# Patient Record
Sex: FEMALE | Race: WHITE | NOT HISPANIC OR LATINO | Employment: OTHER | URBAN - METROPOLITAN AREA
[De-identification: names, ages, dates, MRNs, and addresses within clinical notes are randomized per-mention and may not be internally consistent; named-entity substitution may affect disease eponyms.]

---

## 2017-07-13 PROBLEM — M81.0 AGE-RELATED OSTEOPOROSIS WITHOUT CURRENT PATHOLOGICAL FRACTURE: Status: ACTIVE | Noted: 2017-07-13

## 2017-09-10 ENCOUNTER — OFFICE VISIT (OUTPATIENT)
Dept: URGENT CARE | Facility: CLINIC | Age: 77
End: 2017-09-10
Payer: MEDICARE

## 2017-09-10 VITALS
SYSTOLIC BLOOD PRESSURE: 118 MMHG | BODY MASS INDEX: 27.32 KG/M2 | HEIGHT: 65 IN | RESPIRATION RATE: 18 BRPM | WEIGHT: 164 LBS | TEMPERATURE: 97 F | OXYGEN SATURATION: 98 % | DIASTOLIC BLOOD PRESSURE: 44 MMHG | HEART RATE: 97 BPM

## 2017-09-10 DIAGNOSIS — L03.012 CELLULITIS OF FINGER OF LEFT HAND: ICD-10-CM

## 2017-09-10 DIAGNOSIS — M79.645 PAIN OF FINGER OF LEFT HAND: Primary | ICD-10-CM

## 2017-09-10 PROCEDURE — 96372 THER/PROPH/DIAG INJ SC/IM: CPT | Mod: S$GLB,,, | Performed by: SURGERY

## 2017-09-10 PROCEDURE — 99203 OFFICE O/P NEW LOW 30 MIN: CPT | Mod: 25,S$GLB,, | Performed by: SURGERY

## 2017-09-10 PROCEDURE — 1159F MED LIST DOCD IN RCRD: CPT | Mod: S$GLB,,, | Performed by: SURGERY

## 2017-09-10 RX ORDER — BETAMETHASONE SODIUM PHOSPHATE AND BETAMETHASONE ACETATE 3; 3 MG/ML; MG/ML
9 INJECTION, SUSPENSION INTRA-ARTICULAR; INTRALESIONAL; INTRAMUSCULAR; SOFT TISSUE ONCE
Status: COMPLETED | OUTPATIENT
Start: 2017-09-10 | End: 2017-09-10

## 2017-09-10 RX ORDER — MUPIROCIN 20 MG/G
OINTMENT TOPICAL 3 TIMES DAILY
Qty: 1 TUBE | Refills: 0 | Status: SHIPPED | OUTPATIENT
Start: 2017-09-10 | End: 2018-02-05

## 2017-09-10 RX ORDER — SULFAMETHOXAZOLE AND TRIMETHOPRIM 800; 160 MG/1; MG/1
1 TABLET ORAL 2 TIMES DAILY
COMMUNITY
End: 2018-02-05

## 2017-09-10 RX ADMIN — BETAMETHASONE SODIUM PHOSPHATE AND BETAMETHASONE ACETATE 9 MG: 3; 3 INJECTION, SUSPENSION INTRA-ARTICULAR; INTRALESIONAL; INTRAMUSCULAR; SOFT TISSUE at 10:09

## 2017-09-10 NOTE — PROGRESS NOTES
"Subjective:       Patient ID: Coby Self is a 77 y.o. female.    Vitals:  height is 5' 5" (1.651 m) and weight is 74.4 kg (164 lb). Her temperature is 97.3 °F (36.3 °C). Her blood pressure is 118/44 (abnormal) and her pulse is 97. Her respiration is 18 and oxygen saturation is 98%.     Chief Complaint: Cellulitis    Edema   This is a new problem. The current episode started in the past 7 days. The problem occurs constantly. The problem has been gradually worsening. Associated symptoms include joint swelling. Pertinent negatives include no abdominal pain, chest pain, chills, fever, headaches, nausea, rash, sore throat or vomiting. Nothing aggravates the symptoms. She has tried ice and NSAIDs (Bactrim) for the symptoms. The treatment provided no relief.     Review of Systems   Constitution: Negative for chills and fever.   HENT: Negative for sore throat.    Eyes: Negative for blurred vision.   Cardiovascular: Negative for chest pain.   Respiratory: Negative for shortness of breath.    Skin: Negative for rash.   Musculoskeletal: Positive for joint pain (RT Index Finger) and joint swelling. Negative for back pain.   Gastrointestinal: Negative for abdominal pain, diarrhea, nausea and vomiting.   Neurological: Negative for headaches.   Psychiatric/Behavioral: The patient is not nervous/anxious.        Objective:      Physical Exam   Musculoskeletal:        Hands:      Assessment:       1. Pain of finger of left hand    2. Cellulitis of finger of left hand        Plan:         Pain of finger of left hand  -     betamethasone acetate-betamethasone sodium phosphate injection 9 mg; Inject 1.5 mLs (9 mg total) into the muscle once.    Cellulitis of finger of left hand  -     mupirocin (BACTROBAN) 2 % ointment; Apply topically 3 (three) times daily.  Dispense: 1 Tube; Refill: 0    Pt with multiple allergies, including clindamycin. Her cellulitis is contained to original site of infection, not much improved or " worse.Primarily causing pain and unable to take pain medication due to nausea. D/w pt use of tramadol and tylenol. Steroid injection given as adjunct for pain management. Mupirocin added topical for cellulitis. Will f/u tomorrow with surgeon.

## 2017-09-10 NOTE — PATIENT INSTRUCTIONS
Discharge Instructions for Cellulitis  You have been diagnosed with cellulitis. This is an infection in the deepest layer of the skin. In some cases, the infection also affects the muscle. Cellulitis is caused by bacteria. The bacteria can enter the body through broken skin. This can happen with a cut, scratch, animal bite, or an insect bite that has been scratched. You may have been treated in the hospital with antibiotics and fluids. You will likely be given a prescription for antibiotics to take at home. This sheet will help you take care of yourself at home.  Home care  When you are home:  · Take the prescribed antibiotic medicine you are given as directed until it is gone. Take it even if you feel better. It treats the infection and stops it from returning. Not taking all the medicine can make future infections hard to treat.  · Keep the infected area clean.  · When possible, raise the infected area above the level of your heart. This helps keep swelling down.  · Talk with your healthcare provider if you are in pain. Ask what kind of over-the-counter medicine you can take for pain.  · Apply clean bandages as advised.  · Take your temperature once a day for a week.  · Wash your hands often to prevent spreading the infection.  In the future, wash your hands before and after you touch cuts, scratches, or bandages. This will help prevent infection.   When to call your healthcare provider  Call your healthcare provider immediately if you have any of the following:  · Difficulty or pain when moving the joints above or below the infected area  · Discharge or pus draining from the area  · Fever of 100.4°F (38°C) or higher, or as directed by your healthcare provider  · Pain that gets worse in or around the infected   · Redness that gets worse in or around the infected area, particularly if the area of redness expands to a wider area  · Shaking chills  · Swelling of the infected area  · Vomiting   Date Last Reviewed:  8/1/2016  © 6233-5811 [employer]. [address] [place] [address], [place] [address]. All rights reserved. This information is not intended as a substitute for professional medical care. Always follow your healthcare professional's instructions.

## 2017-09-14 ENCOUNTER — TELEPHONE (OUTPATIENT)
Dept: URGENT CARE | Facility: CLINIC | Age: 77
End: 2017-09-14

## 2017-12-26 PROBLEM — R09.02 HYPOXIA: Status: ACTIVE | Noted: 2017-12-26

## 2017-12-26 PROBLEM — I10 ESSENTIAL HYPERTENSION: Chronic | Status: ACTIVE | Noted: 2017-12-26

## 2017-12-26 PROBLEM — J11.1 INFLUENZA: Status: ACTIVE | Noted: 2017-12-26

## 2017-12-26 PROBLEM — J18.9 RIGHT LOWER LOBE PNEUMONIA: Status: ACTIVE | Noted: 2017-12-26

## 2017-12-27 PROBLEM — G89.29 CHRONIC PAIN: Chronic | Status: ACTIVE | Noted: 2017-12-27

## 2017-12-28 PROBLEM — R09.02 HYPOXIA: Status: RESOLVED | Noted: 2017-12-26 | Resolved: 2017-12-28

## 2018-04-16 ENCOUNTER — OFFICE VISIT (OUTPATIENT)
Dept: WOUND CARE | Facility: HOSPITAL | Age: 78
End: 2018-04-16
Attending: SURGERY
Payer: MEDICARE

## 2018-04-16 VITALS
SYSTOLIC BLOOD PRESSURE: 134 MMHG | TEMPERATURE: 98 F | DIASTOLIC BLOOD PRESSURE: 57 MMHG | HEART RATE: 59 BPM | RESPIRATION RATE: 18 BRPM

## 2018-04-16 DIAGNOSIS — L89.153 SACRAL DECUBITUS ULCER, STAGE III: ICD-10-CM

## 2018-04-16 PROCEDURE — 11042 DBRDMT SUBQ TIS 1ST 20SQCM/<: CPT

## 2018-04-16 PROCEDURE — 27201912 HC WOUND CARE DEBRIDEMENT SUPPLIES

## 2018-04-16 PROCEDURE — 99212 OFFICE O/P EST SF 10 MIN: CPT | Mod: 25

## 2018-04-16 NOTE — PROGRESS NOTES
[facility]  Wound Care  History and Physical    Problem List Items Addressed This Visit     Sacral decubitus ulcer, stage III    Overview     77-year-old female who presents to the wound center with a sacral ulcer.  Patient states that she has had this ulcer on and off for the last 6 months.  Patient struggles with back pain and sleeps sitting up in a bed.  She says she has healed this ulcer before but it has returned.  She has been applying Silvadene and completed a course of oral antibiotics.  She denies significant drainage from the area.  She reports the wound has been painful.  She says she has tried several offloading cushions and has a new cushion that she has ordered which she will be receiving this week.         Current Assessment & Plan     Continue offloading.  Debridement wound as needed.  Santyl dressing.                   History:    Past Medical History:   Diagnosis Date    Arthritis     GERD (gastroesophageal reflux disease)     Hyperlipemia     Hypertension     Vertigo        Past Surgical History:   Procedure Laterality Date    CHOLECYSTECTOMY      EYE SURGERY      FISSURE SURGERY      HERNIA REPAIR      HYSTERECTOMY      NECK SURGERY         No family history on file.     reports that she has never smoked. She has never used smokeless tobacco. She reports that she drinks alcohol. She reports that she does not use drugs.    has a current medication list which includes the following prescription(s): aspirin, atorvastatin, bifidobacterium infantis, calcium carbonate/vitamin d3, clobetasol 0.05%, clonidine, denosumab, dexlansoprazole, hydrochlorothiazide, meclizine, multivitamin/iron/folic acid, sotalol, tramadol, and valsartan.    Allergies:  Adhesive; Azithromycin; Ciprofloxacin; Codeine; Demerol [meperidine]; Doxycycline; Fosamax [alendronate]; Gabapentin; Lincomycin; Penicillins; and Vicodin [hydrocodone-acetaminophen]    Review of Systems:  ROS  Denies fever or  chills.    There were no vitals filed for this visit.      BMI:  There is no height or weight on file to calculate BMI.    Physical Exam:  Physical Exam well-developed well-nourished sitting up in bed in no distress.  Lungs are clear.  Heart is regular.  Abdomen soft with no tenderness or masses.  No lower extremity edema.  No gross neurological deficits.  Small stage III sacral decubitus ulcer with no sign of infection.    A1C:  No results for input(s): HGBA1C in the last 2160 hours.  BMP:  No results for input(s): GLU, NA, K, CL, CO2, BUN, CREATININE, CALCIUM, MG in the last 2160 hours.   CBC:  No results for input(s): WBC, RBC, HGB, HCT, PLT, MCV, MCH, MCHC in the last 2160 hours.  CMP:  No results for input(s): GLU, CALCIUM, ALBUMIN, PROT, NA, K, CO2, CL, BUN, ALKPHOS, ALT, AST, BILITOT in the last 2160 hours.    Invalid input(s): CREATININ  PREALBUMIN:  No results for input(s): PREALBUMIN in the last 2160 hours.  WOUND CULTURES:  No results for input(s): LABAERO in the last 2160 hours.        Plan:  See Wound Docs note for plan and follow up.        Hussein DUNCAN Cordova  [facility]

## 2018-04-23 ENCOUNTER — OFFICE VISIT (OUTPATIENT)
Dept: WOUND CARE | Facility: HOSPITAL | Age: 78
End: 2018-04-23
Attending: SURGERY
Payer: MEDICARE

## 2018-04-23 VITALS
HEART RATE: 78 BPM | SYSTOLIC BLOOD PRESSURE: 132 MMHG | TEMPERATURE: 98 F | RESPIRATION RATE: 18 BRPM | DIASTOLIC BLOOD PRESSURE: 78 MMHG

## 2018-04-23 DIAGNOSIS — G89.29 CHRONIC MIDLINE LOW BACK PAIN WITHOUT SCIATICA: ICD-10-CM

## 2018-04-23 DIAGNOSIS — L89.153 SACRAL DECUBITUS ULCER, STAGE III: ICD-10-CM

## 2018-04-23 DIAGNOSIS — M54.50 CHRONIC MIDLINE LOW BACK PAIN WITHOUT SCIATICA: ICD-10-CM

## 2018-04-23 PROCEDURE — 11042 DBRDMT SUBQ TIS 1ST 20SQCM/<: CPT

## 2018-04-23 PROCEDURE — 27201912 HC WOUND CARE DEBRIDEMENT SUPPLIES

## 2018-04-23 NOTE — ASSESSMENT & PLAN NOTE
Had a long discussion with the patient regarding offloading.  The fact that she sits to sleep appears to be the cause of the wound.  She also sits at work.  She works about 2-1/2 days a week.  She would try to sleep laying down even if that means lying on her side.  I also discussed with her obtaining a foam topper for her mattress.

## 2018-04-23 NOTE — PROGRESS NOTES
[facility]  Wound Care  Progress Note    Problem List Items Addressed This Visit        Derm    Sacral decubitus ulcer, stage III    Overview     77-year-old female who presented to the wound center on 4/16/18 with a sacral ulcer.  Patient states that she has had this ulcer on and off for 6 months.  Patient struggles with back pain and sleeps sitting up in a bed.  She says she has healed this ulcer before but it has returned.  She had been applying Silvadene and completed a course of oral antibiotics.  She denied significant drainage from the area.  She reports the wound has been painful.  She says she has tried several offloading cushions including a donut and a waffle cushion.  She has been utilizing santyl since admission to wound clinic.         Current Assessment & Plan     Had a long discussion with the patient regarding offloading.  The fact that she sits to sleep appears to be the cause of the wound.  She also sits at work.  She works about 2-1/2 days a week.  She would try to sleep laying down even if that means lying on her side.  I also discussed with her obtaining a foam topper for her mattress.            Orthopedic    Chronic midline low back pain without sciatica          See wound doc progress notes. Documents will be scanned.        George Alarcon  [facility]

## 2018-04-30 ENCOUNTER — OFFICE VISIT (OUTPATIENT)
Dept: WOUND CARE | Facility: HOSPITAL | Age: 78
End: 2018-04-30
Attending: SURGERY
Payer: MEDICARE

## 2018-04-30 VITALS
HEART RATE: 53 BPM | TEMPERATURE: 98 F | DIASTOLIC BLOOD PRESSURE: 58 MMHG | SYSTOLIC BLOOD PRESSURE: 136 MMHG | RESPIRATION RATE: 18 BRPM

## 2018-04-30 DIAGNOSIS — L89.153 SACRAL DECUBITUS ULCER, STAGE III: Primary | ICD-10-CM

## 2018-04-30 PROCEDURE — 27201912 HC WOUND CARE DEBRIDEMENT SUPPLIES

## 2018-04-30 PROCEDURE — 11042 DBRDMT SUBQ TIS 1ST 20SQCM/<: CPT

## 2018-04-30 NOTE — PROGRESS NOTES
[facility]  Wound Care  Progress Note    Problem List Items Addressed This Visit     Sacral decubitus ulcer, stage III - Primary    Overview     77-year-old female who presented to the wound center on 4/16/18 with a sacral ulcer.  Patient states that she has had this ulcer on and off for 6 months.  Patient struggles with back pain and sleeps sitting up in a bed.  She says she has healed this ulcer before but it has returned.  She had been applying Silvadene and completed a course of oral antibiotics.  She denied significant drainage from the area.  She reports the wound has been painful.  She says she has tried several offloading cushions including a donut and a waffle cushion.  She has been utilizing santyl since admission to wound clinic.               See wound doc progress notes. Documents will be scanned.        Hussein Cordova  [facility]

## 2018-05-07 ENCOUNTER — OFFICE VISIT (OUTPATIENT)
Dept: WOUND CARE | Facility: HOSPITAL | Age: 78
End: 2018-05-07
Attending: SURGERY
Payer: MEDICARE

## 2018-05-07 VITALS
DIASTOLIC BLOOD PRESSURE: 40 MMHG | RESPIRATION RATE: 18 BRPM | SYSTOLIC BLOOD PRESSURE: 119 MMHG | HEART RATE: 55 BPM | TEMPERATURE: 98 F

## 2018-05-07 DIAGNOSIS — G89.29 CHRONIC MIDLINE LOW BACK PAIN WITHOUT SCIATICA: ICD-10-CM

## 2018-05-07 DIAGNOSIS — M54.50 CHRONIC MIDLINE LOW BACK PAIN WITHOUT SCIATICA: ICD-10-CM

## 2018-05-07 DIAGNOSIS — L89.153 SACRAL DECUBITUS ULCER, STAGE III: Primary | ICD-10-CM

## 2018-05-07 PROCEDURE — 11042 DBRDMT SUBQ TIS 1ST 20SQCM/<: CPT

## 2018-05-07 PROCEDURE — 27201912 HC WOUND CARE DEBRIDEMENT SUPPLIES

## 2018-05-07 NOTE — PROGRESS NOTES
[facility]  Wound Care  Progress Note    Problem List Items Addressed This Visit        Derm    Sacral decubitus ulcer, stage III - Primary    Overview     77-year-old female who presented to the wound center on 4/16/18 with a sacral ulcer.  Patient states that she has had this ulcer on and off for 6 months.  Patient struggles with back pain and sleeps sitting up in a bed.  She says she has healed this ulcer before but it has returned.  She had been applying Silvadene and completed a course of oral antibiotics.  She denied significant drainage from the area.  She reports the wound has been painful but improved.  She says she has tried several offloading cushions including a donut and a waffle cushion.  She has been utilizing santyl since admission to wound clinic.         Current Assessment & Plan     Continue to make efforts to sleep lying down on her sides for offloading.            Orthopedic    Chronic midline low back pain without sciatica          See wound doc progress notes. Documents will be scanned.        George Alarcon  [facility]

## 2018-05-14 ENCOUNTER — OFFICE VISIT (OUTPATIENT)
Dept: WOUND CARE | Facility: HOSPITAL | Age: 78
End: 2018-05-14
Attending: SURGERY
Payer: MEDICARE

## 2018-05-14 VITALS — HEART RATE: 57 BPM | RESPIRATION RATE: 18 BRPM | DIASTOLIC BLOOD PRESSURE: 52 MMHG | SYSTOLIC BLOOD PRESSURE: 110 MMHG

## 2018-05-14 DIAGNOSIS — L89.153 SACRAL DECUBITUS ULCER, STAGE III: ICD-10-CM

## 2018-05-14 PROCEDURE — 27201912 HC WOUND CARE DEBRIDEMENT SUPPLIES

## 2018-05-14 PROCEDURE — 11042 DBRDMT SUBQ TIS 1ST 20SQCM/<: CPT

## 2018-05-14 NOTE — PROGRESS NOTES
[facility]  Wound Care  Progress Note    Problem List Items Addressed This Visit     Sacral decubitus ulcer, stage III    Overview     77-year-old female who presented to the wound center on 4/16/18 with a sacral ulcer.  Patient states that she has had this ulcer on and off for 6 months.  Patient struggles with back pain and sleeps sitting up in a bed.  She says she has healed this ulcer before but it has returned.  She had been applying Silvadene and completed a course of oral antibiotics.  She denied significant drainage from the area.  She reports the wound has been painful but improved.  She says she has tried several offloading cushions including a donut and a waffle cushion.  She has been utilizing santyl since admission to wound clinic.  Wound is starting to granulate with minimal amount of slough               See wound doc progress notes. Documents will be scanned.        Hussein Cordova  [facility]

## 2018-05-21 ENCOUNTER — OFFICE VISIT (OUTPATIENT)
Dept: WOUND CARE | Facility: HOSPITAL | Age: 78
End: 2018-05-21
Attending: SURGERY
Payer: MEDICARE

## 2018-05-21 VITALS
HEART RATE: 60 BPM | DIASTOLIC BLOOD PRESSURE: 58 MMHG | RESPIRATION RATE: 18 BRPM | TEMPERATURE: 98 F | SYSTOLIC BLOOD PRESSURE: 114 MMHG

## 2018-05-21 DIAGNOSIS — G89.29 CHRONIC MIDLINE LOW BACK PAIN WITHOUT SCIATICA: ICD-10-CM

## 2018-05-21 DIAGNOSIS — L89.153 SACRAL DECUBITUS ULCER, STAGE III: Primary | ICD-10-CM

## 2018-05-21 DIAGNOSIS — M54.50 CHRONIC MIDLINE LOW BACK PAIN WITHOUT SCIATICA: ICD-10-CM

## 2018-05-21 PROCEDURE — 27201912 HC WOUND CARE DEBRIDEMENT SUPPLIES

## 2018-05-21 PROCEDURE — 11042 DBRDMT SUBQ TIS 1ST 20SQCM/<: CPT

## 2018-05-21 NOTE — PROGRESS NOTES
[facility]  Wound Care  Progress Note    Problem List Items Addressed This Visit        Derm    Sacral decubitus ulcer, stage III - Primary    Overview     77-year-old female who presented to the wound center on 4/16/18 with a sacral ulcer.  Patient states that she has had this ulcer on and off for 6 months.  Patient struggles with back pain and sleeps sitting up in a bed.  She says she has healed this ulcer before but it has returned.  She had been applying Silvadene and completed a course of oral antibiotics.  She denied significant drainage from the area.  She reports the wound as being painful on admission.  She says she has tried several offloading cushions including a donut and a waffle cushion.  She has obtained a foam topper for her mattress for pressure relief.  She is attempting to sleep in a supine position  She has been utilizing santyl since admission to wound clinic.  Wound is starting to granulate with minimal amount of slough         Current Assessment & Plan     Wound is improving  Continue offloading.            Orthopedic    Chronic midline low back pain without sciatica          See wound doc progress notes. Documents will be scanned.        George Alarcon  [facility]

## 2018-06-04 ENCOUNTER — OFFICE VISIT (OUTPATIENT)
Dept: WOUND CARE | Facility: HOSPITAL | Age: 78
End: 2018-06-04
Attending: SURGERY
Payer: MEDICARE

## 2018-06-04 VITALS
HEART RATE: 69 BPM | TEMPERATURE: 98 F | SYSTOLIC BLOOD PRESSURE: 116 MMHG | DIASTOLIC BLOOD PRESSURE: 75 MMHG | RESPIRATION RATE: 18 BRPM

## 2018-06-04 DIAGNOSIS — L89.153 SACRAL DECUBITUS ULCER, STAGE III: ICD-10-CM

## 2018-06-04 PROCEDURE — 11042 DBRDMT SUBQ TIS 1ST 20SQCM/<: CPT

## 2018-06-04 PROCEDURE — 27201912 HC WOUND CARE DEBRIDEMENT SUPPLIES

## 2018-06-04 NOTE — PROGRESS NOTES
[facility]  Wound Care  Progress Note    Problem List Items Addressed This Visit     Sacral decubitus ulcer, stage III    Overview     77-year-old female who presented to the wound center on 4/16/18 with a sacral ulcer.  Patient states that she has had this ulcer on and off for 6 months.  Patient struggles with back pain and sleeps sitting up in a bed.  She says she has healed this ulcer before but it has returned.  She had been applying Silvadene and completed a course of oral antibiotics.  She denied significant drainage from the area.  She reports the wound as being painful on admission.  She says she has tried several offloading cushions including a donut and a waffle cushion.  She has obtained a foam topper for her mattress for pressure relief.  She is attempting to sleep in a supine position  She has been utilizing santyl since admission to wound clinic.  Wound bed consist now mostly granulating tissuesignificant slough present.  We will change to a collagen dressing.               See wound doc progress notes. Documents will be scanned.        Hussein Cordova  [facility]

## 2018-06-11 ENCOUNTER — OFFICE VISIT (OUTPATIENT)
Dept: WOUND CARE | Facility: HOSPITAL | Age: 78
End: 2018-06-11
Attending: SURGERY
Payer: MEDICARE

## 2018-06-11 VITALS — HEART RATE: 67 BPM | SYSTOLIC BLOOD PRESSURE: 105 MMHG | RESPIRATION RATE: 18 BRPM | DIASTOLIC BLOOD PRESSURE: 47 MMHG

## 2018-06-11 DIAGNOSIS — G89.29 CHRONIC MIDLINE LOW BACK PAIN WITHOUT SCIATICA: ICD-10-CM

## 2018-06-11 DIAGNOSIS — L89.153 SACRAL DECUBITUS ULCER, STAGE III: Primary | ICD-10-CM

## 2018-06-11 DIAGNOSIS — M54.50 CHRONIC MIDLINE LOW BACK PAIN WITHOUT SCIATICA: ICD-10-CM

## 2018-06-11 PROCEDURE — 11042 DBRDMT SUBQ TIS 1ST 20SQCM/<: CPT

## 2018-06-11 PROCEDURE — 27201912 HC WOUND CARE DEBRIDEMENT SUPPLIES

## 2018-06-11 NOTE — PROGRESS NOTES
[facility]  Wound Care  Progress Note    Problem List Items Addressed This Visit        Derm    Sacral decubitus ulcer, stage III - Primary    Overview     77-year-old female who presented to the wound center on 4/16/18 with a sacral ulcer.  Patient states that she has had this ulcer on and off for 6 months.  Patient struggles with back pain and sleeps sitting up in a bed.  She says she has healed this ulcer before but it has returned.  She had been applying Silvadene and completed a course of oral antibiotics.  She denied significant drainage from the area.  She reports the wound as being painful on admission.  She says she has tried several offloading cushions including a donut and a waffle cushion.  She has obtained a foam topper for her mattress for pressure relief.  She is attempting to sleep in a supine position  She had been utilizing santyl since admission to wound clinic.   Collagen dressing was begun on 6/4/2018.         Current Assessment & Plan     Wound is improving with now limited slough.  Continue offloading.  Continue collagen.            Orthopedic    Chronic midline low back pain without sciatica          See wound doc progress notes. Documents will be scanned.        George Alarcon  [facility]

## 2018-06-25 ENCOUNTER — OFFICE VISIT (OUTPATIENT)
Dept: WOUND CARE | Facility: HOSPITAL | Age: 78
End: 2018-06-25
Attending: SURGERY
Payer: MEDICARE

## 2018-06-25 VITALS — HEART RATE: 65 BPM | SYSTOLIC BLOOD PRESSURE: 118 MMHG | DIASTOLIC BLOOD PRESSURE: 58 MMHG | RESPIRATION RATE: 18 BRPM

## 2018-06-25 DIAGNOSIS — M54.50 CHRONIC MIDLINE LOW BACK PAIN WITHOUT SCIATICA: ICD-10-CM

## 2018-06-25 DIAGNOSIS — G89.29 OTHER CHRONIC PAIN: Chronic | ICD-10-CM

## 2018-06-25 DIAGNOSIS — L89.153 SACRAL DECUBITUS ULCER, STAGE III: Primary | ICD-10-CM

## 2018-06-25 DIAGNOSIS — G89.29 CHRONIC MIDLINE LOW BACK PAIN WITHOUT SCIATICA: ICD-10-CM

## 2018-06-25 PROCEDURE — 27201912 HC WOUND CARE DEBRIDEMENT SUPPLIES

## 2018-06-25 PROCEDURE — 11042 DBRDMT SUBQ TIS 1ST 20SQCM/<: CPT

## 2018-06-25 NOTE — PROGRESS NOTES
[facility]  Wound Care  Progress Note    Problem List Items Addressed This Visit        Neuro    Chronic pain (Chronic)       Derm    Sacral decubitus ulcer, stage III - Primary    Overview     77-year-old female who presented to the wound center on 4/16/18 with a sacral ulcer.  Patient states that she has had this ulcer on and off for 6 months.  Patient struggles with back pain and sleeps sitting up in a bed.  She says she has healed this ulcer before but it has returned.  She had been applying Silvadene and completed a course of oral antibiotics.  She denied significant drainage from the area.  She reports the wound as being painful on admission.  She says she has tried several offloading cushions including a donut and a waffle cushion.  She has obtained a foam topper for her mattress for pressure relief.  She is attempting to sleep in a supine position  She had been utilizing santyl since admission to wound clinic.   Collagen dressing was begun on 6/4/2018.         Current Assessment & Plan     Wound is improving.  Continue collagen.            Orthopedic    Chronic midline low back pain without sciatica          See wound doc progress notes. Documents will be scanned.        George Alarcon  [facility]

## 2018-07-09 ENCOUNTER — OFFICE VISIT (OUTPATIENT)
Dept: WOUND CARE | Facility: HOSPITAL | Age: 78
End: 2018-07-09
Attending: SURGERY
Payer: MEDICARE

## 2018-07-09 VITALS — DIASTOLIC BLOOD PRESSURE: 59 MMHG | RESPIRATION RATE: 18 BRPM | HEART RATE: 70 BPM | SYSTOLIC BLOOD PRESSURE: 146 MMHG

## 2018-07-09 DIAGNOSIS — L89.153 SACRAL DECUBITUS ULCER, STAGE III: ICD-10-CM

## 2018-07-09 PROCEDURE — 99499 UNLISTED E&M SERVICE: CPT | Mod: ,,, | Performed by: SURGERY

## 2018-07-09 PROCEDURE — 11042 DBRDMT SUBQ TIS 1ST 20SQCM/<: CPT

## 2018-07-09 PROCEDURE — 27201912 HC WOUND CARE DEBRIDEMENT SUPPLIES

## 2018-07-09 NOTE — PROGRESS NOTES
[facility]  Wound Care  Progress Note    Problem List Items Addressed This Visit     Sacral decubitus ulcer, stage III    Overview     77-year-old female who presented to the wound center on 4/16/18 with a sacral ulcer.  Patient states that she has had this ulcer on and off for 6 months.  Patient struggles with back pain and sleeps sitting up in a bed.  She says she has healed this ulcer before but it has returned.  She had been applying Silvadene and completed a course of oral antibiotics.  She denied significant drainage from the area.  She reports the wound as being painful on admission.  She says she has tried several offloading cushions including a donut and a waffle cushion.  She has obtained a foam topper for her mattress for pressure relief.  She is attempting to sleep in a supine position  She had been utilizing santyl since admission to wound clinic.   Collagen dressing was begun on 6/4/2018.  Wound is nearly resolved but there is a area superiorly that has not closed.         Current Assessment & Plan     We will try draw-Davian                 See wound doc progress notes. Documents will be scanned.        Hussein Cordova  [facility]

## 2018-07-23 ENCOUNTER — OFFICE VISIT (OUTPATIENT)
Dept: WOUND CARE | Facility: HOSPITAL | Age: 78
End: 2018-07-23
Attending: SURGERY
Payer: MEDICARE

## 2018-07-23 VITALS — RESPIRATION RATE: 18 BRPM

## 2018-07-23 DIAGNOSIS — L89.153 SACRAL DECUBITUS ULCER, STAGE III: Primary | ICD-10-CM

## 2018-07-23 PROCEDURE — 99499 UNLISTED E&M SERVICE: CPT | Mod: ,,, | Performed by: SURGERY

## 2018-07-23 PROCEDURE — 27201912 HC WOUND CARE DEBRIDEMENT SUPPLIES

## 2018-07-23 PROCEDURE — 11042 DBRDMT SUBQ TIS 1ST 20SQCM/<: CPT

## 2018-07-23 NOTE — PROGRESS NOTES
[facility]  Wound Care  Progress Note    Problem List Items Addressed This Visit     Sacral decubitus ulcer, stage III - Primary    Overview     77-year-old female who presented to the wound center on 4/16/18 with a sacral ulcer.  Patient states that she has had this ulcer on and off for 6 months.  Patient struggles with back pain and sleeps sitting up in a bed.  She says she has healed this ulcer before but it has returned.  She had been applying Silvadene and completed a course of oral antibiotics.  She denied significant drainage from the area.  She reports the wound as being painful on admission.  She says she has tried several offloading cushions including a donut and a waffle cushion.  She has obtained a foam topper for her mattress for pressure relief.  She is attempting to sleep in a supine position  She had been utilizing santyl since admission to wound clinic.   Collagen dressing was begun on 6/4/2018.  Wound is nearly resolved but there is a area superiorly that has not closed.               See wound doc progress notes. Documents will be scanned.        Hussein Cordova  [facility]

## 2018-08-06 ENCOUNTER — OFFICE VISIT (OUTPATIENT)
Dept: WOUND CARE | Facility: HOSPITAL | Age: 78
End: 2018-08-06
Attending: SURGERY
Payer: MEDICARE

## 2018-08-06 VITALS
RESPIRATION RATE: 18 BRPM | DIASTOLIC BLOOD PRESSURE: 50 MMHG | SYSTOLIC BLOOD PRESSURE: 111 MMHG | HEART RATE: 62 BPM | TEMPERATURE: 98 F

## 2018-08-06 DIAGNOSIS — L89.153 SACRAL DECUBITUS ULCER, STAGE III: Primary | ICD-10-CM

## 2018-08-06 PROCEDURE — 99499 UNLISTED E&M SERVICE: CPT | Mod: ,,, | Performed by: SURGERY

## 2018-08-06 PROCEDURE — 27201912 HC WOUND CARE DEBRIDEMENT SUPPLIES

## 2018-08-06 PROCEDURE — 11042 DBRDMT SUBQ TIS 1ST 20SQCM/<: CPT

## 2018-08-06 NOTE — ASSESSMENT & PLAN NOTE
Wound has been having slow improvement.  There has been increased drainage.  Begin silver alginate.

## 2018-08-06 NOTE — PROGRESS NOTES
[facility]  Wound Care  Progress Note    Problem List Items Addressed This Visit        Derm    Sacral decubitus ulcer, stage III - Primary    Overview     77-year-old female who presented to the wound center on 4/16/18 with a sacral ulcer.  Patient states that she has had this ulcer on and off for 6 months.  Patient struggles with back pain and sleeps sitting up in a bed.  She says she has healed this ulcer before but it has returned.  She had been applying Silvadene and completed a course of oral antibiotics.  She denied significant drainage from the area.  She reports the wound as being painful on admission.  She says she has tried several offloading cushions including a donut and a waffle cushion.  She has obtained a foam topper for her mattress for pressure relief.  She is attempting to sleep in a supine position  She had been utilizing santyl since admission to wound clinic.   Collagen dressing was begun on 6/4/2018.   Silver alginate was initiated on 08/06/2018.         Current Assessment & Plan     Wound has been having slow improvement.  There has been increased drainage.  Begin silver alginate.               See wound doc progress notes. Documents will be scanned.        George Alarcon  [facility]

## 2018-08-20 ENCOUNTER — OFFICE VISIT (OUTPATIENT)
Dept: WOUND CARE | Facility: HOSPITAL | Age: 78
End: 2018-08-20
Attending: SURGERY
Payer: MEDICARE

## 2018-08-20 VITALS
HEART RATE: 61 BPM | DIASTOLIC BLOOD PRESSURE: 47 MMHG | SYSTOLIC BLOOD PRESSURE: 95 MMHG | RESPIRATION RATE: 18 BRPM | TEMPERATURE: 98 F

## 2018-08-20 DIAGNOSIS — L89.153 SACRAL DECUBITUS ULCER, STAGE III: ICD-10-CM

## 2018-08-20 PROCEDURE — 27201912 HC WOUND CARE DEBRIDEMENT SUPPLIES

## 2018-08-20 PROCEDURE — 99499 UNLISTED E&M SERVICE: CPT | Mod: ,,, | Performed by: SURGERY

## 2018-08-20 PROCEDURE — 11042 DBRDMT SUBQ TIS 1ST 20SQCM/<: CPT

## 2018-08-20 NOTE — PROGRESS NOTES
[facility]  Wound Care  Progress Note    Problem List Items Addressed This Visit        Derm    Sacral decubitus ulcer, stage III    Overview     77-year-old female who presented to the wound center on 4/16/18 with a sacral ulcer.  Patient states that she has had this ulcer on and off for 6 months.  Patient struggles with back pain and sleeps sitting up in a bed.  She says she has healed this ulcer before but it has returned.  She had been applying Silvadene and completed a course of oral antibiotics.  She denied significant drainage from the area.  She reports the wound as being painful on admission.  She says she has tried several offloading cushions including a donut and a waffle cushion.  She has obtained a foam topper for her mattress for pressure relief.  She is sleeping in a supine position  She began utilizing santyl upon admission to wound clinic.   Collagen dressing was begun on 6/4/2018.   Silver alginate was initiated on 08/06/2018.         Current Assessment & Plan     Patient denies any pain.  Wound is slowly progressing.               See wound doc progress notes. Documents will be scanned.        George Alarcon  [facility]

## 2018-08-27 PROBLEM — E04.1 THYROID NODULE: Status: ACTIVE | Noted: 2018-08-27

## 2018-09-10 ENCOUNTER — OFFICE VISIT (OUTPATIENT)
Dept: WOUND CARE | Facility: HOSPITAL | Age: 78
End: 2018-09-10
Attending: SURGERY
Payer: MEDICARE

## 2018-09-10 VITALS — SYSTOLIC BLOOD PRESSURE: 100 MMHG | DIASTOLIC BLOOD PRESSURE: 41 MMHG | HEART RATE: 63 BPM | RESPIRATION RATE: 18 BRPM

## 2018-09-10 DIAGNOSIS — G89.29 CHRONIC MIDLINE LOW BACK PAIN WITHOUT SCIATICA: Primary | ICD-10-CM

## 2018-09-10 DIAGNOSIS — L89.153 SACRAL DECUBITUS ULCER, STAGE III: ICD-10-CM

## 2018-09-10 DIAGNOSIS — M54.50 CHRONIC MIDLINE LOW BACK PAIN WITHOUT SCIATICA: Primary | ICD-10-CM

## 2018-09-10 PROCEDURE — 11042 DBRDMT SUBQ TIS 1ST 20SQCM/<: CPT

## 2018-09-10 PROCEDURE — 99499 UNLISTED E&M SERVICE: CPT | Mod: ,,, | Performed by: SURGERY

## 2018-09-10 PROCEDURE — 27201912 HC WOUND CARE DEBRIDEMENT SUPPLIES

## 2018-09-10 NOTE — PROGRESS NOTES
[facility]  Wound Care  Progress Note    Problem List Items Addressed This Visit        Derm    Sacral decubitus ulcer, stage III    Overview     77-year-old female who presented to the wound center on 4/16/18 with a sacral ulcer.  Patient states that she has had this ulcer on and off for 6 months.  Patient struggles with back pain and sleeps sitting up in a bed.  She says she has healed this ulcer before but it has returned.  She had been applying Silvadene and completed a course of oral antibiotics.  She denied significant drainage from the area.  She reports the wound as being painful on admission.  She says she has tried several offloading cushions including a donut and a waffle cushion.  She does use a waffle cushion for sitting.  She has obtained a foam topper for her mattress for pressure relief.  She is sleeping on her left and right sides avoiding her back.  She began utilizing santyl upon admission to wound clinic.   Collagen dressing was begun on 6/4/2018.   Silver alginate was initiated on 08/06/2018.         Current Assessment & Plan     There has been little change in the wound since last visit.  We reviewed the importance of offloading once again any particular trying to minimize any times sitting.            Orthopedic    Chronic midline low back pain without sciatica - Primary          See wound doc progress notes. Documents will be scanned.        George Alarcon  [facility]

## 2018-09-24 ENCOUNTER — OFFICE VISIT (OUTPATIENT)
Dept: WOUND CARE | Facility: HOSPITAL | Age: 78
End: 2018-09-24
Attending: SURGERY
Payer: MEDICARE

## 2018-09-24 VITALS
DIASTOLIC BLOOD PRESSURE: 53 MMHG | TEMPERATURE: 98 F | SYSTOLIC BLOOD PRESSURE: 131 MMHG | RESPIRATION RATE: 18 BRPM | HEART RATE: 62 BPM

## 2018-09-24 DIAGNOSIS — L89.153 SACRAL DECUBITUS ULCER, STAGE III: Primary | ICD-10-CM

## 2018-09-24 DIAGNOSIS — M54.50 CHRONIC MIDLINE LOW BACK PAIN WITHOUT SCIATICA: ICD-10-CM

## 2018-09-24 DIAGNOSIS — G89.29 CHRONIC MIDLINE LOW BACK PAIN WITHOUT SCIATICA: ICD-10-CM

## 2018-09-24 PROCEDURE — 11042 DBRDMT SUBQ TIS 1ST 20SQCM/<: CPT

## 2018-09-24 PROCEDURE — 27201912 HC WOUND CARE DEBRIDEMENT SUPPLIES

## 2018-09-24 PROCEDURE — 99499 UNLISTED E&M SERVICE: CPT | Mod: ,,, | Performed by: SURGERY

## 2018-09-24 NOTE — PROGRESS NOTES
[facility]  Wound Care  Progress Note    Problem List Items Addressed This Visit        Derm    Sacral decubitus ulcer, stage III - Primary    Overview     77-year-old female who presented to the wound center on 4/16/18 with a sacral ulcer.  Patient states that she has had this ulcer on and off for 6 months.  Patient struggles with back pain and sleeps sitting up in a bed.  She says she has healed this ulcer before but it has returned.  She had been applying Silvadene and completed a course of oral antibiotics.  She denied significant drainage from the area.  She reports the wound as being painful on admission.  She says she has tried several offloading cushions including a donut and a waffle cushion.  She does use a waffle cushion for sitting.  She has obtained a foam topper for her mattress for pressure relief.  She is sleeping on her left and right sides avoiding her back.  She began utilizing santyl upon admission to wound clinic.   Collagen dressing was begun on 6/4/2018.   Silver alginate was initiated on 08/06/2018.         Current Assessment & Plan     Wound is slightly smaller and improved.  Patient denies any pain at the wound.  Continue silver alginate.            Orthopedic    Chronic midline low back pain without sciatica          See wound doc progress notes. Documents will be scanned.        George Alarcon  [facility]

## 2018-09-24 NOTE — ASSESSMENT & PLAN NOTE
Wound is slightly smaller and improved.  Patient denies any pain at the wound.  Continue silver alginate.

## 2018-10-08 ENCOUNTER — OFFICE VISIT (OUTPATIENT)
Dept: WOUND CARE | Facility: HOSPITAL | Age: 78
End: 2018-10-08
Attending: SURGERY
Payer: MEDICARE

## 2018-10-08 VITALS
HEART RATE: 62 BPM | TEMPERATURE: 98 F | DIASTOLIC BLOOD PRESSURE: 63 MMHG | SYSTOLIC BLOOD PRESSURE: 108 MMHG | RESPIRATION RATE: 18 BRPM

## 2018-10-08 DIAGNOSIS — L89.153 SACRAL DECUBITUS ULCER, STAGE III: ICD-10-CM

## 2018-10-08 PROCEDURE — 27201912 HC WOUND CARE DEBRIDEMENT SUPPLIES

## 2018-10-08 PROCEDURE — 99499 UNLISTED E&M SERVICE: CPT | Mod: ,,, | Performed by: SURGERY

## 2018-10-08 PROCEDURE — 11042 DBRDMT SUBQ TIS 1ST 20SQCM/<: CPT

## 2018-10-08 NOTE — PROGRESS NOTES
[facility]  Wound Care  Progress Note    Problem List Items Addressed This Visit     Sacral decubitus ulcer, stage III    Overview     77-year-old female who presented to the wound center on 4/16/18 with a sacral ulcer.  Patient states that she has had this ulcer on and off for 6 months.  Patient struggles with back pain and sleeps sitting up in a bed.  She says she has healed this ulcer before but it has returned.  She had been applying Silvadene and completed a course of oral antibiotics.  She denied significant drainage from the area.  She reports the wound as being painful on admission.  She says she has tried several offloading cushions including a donut and a waffle cushion.  She does use a waffle cushion for sitting.  She has obtained a foam topper for her mattress for pressure relief.  She is sleeping on her left and right sides avoiding her back.  She began utilizing santyl upon admission to wound clinic.   Collagen dressing was begun on 6/4/2018.   Silver alginate was initiated on 08/06/2018.  Wound remained clean and granulating.  We will try collagen again today.               See wound doc progress notes. Documents will be scanned.        Hussein Cordova  [facility]

## 2018-10-22 ENCOUNTER — OFFICE VISIT (OUTPATIENT)
Dept: WOUND CARE | Facility: HOSPITAL | Age: 78
End: 2018-10-22
Attending: SURGERY
Payer: MEDICARE

## 2018-10-22 VITALS
SYSTOLIC BLOOD PRESSURE: 124 MMHG | RESPIRATION RATE: 18 BRPM | TEMPERATURE: 98 F | DIASTOLIC BLOOD PRESSURE: 59 MMHG | HEART RATE: 62 BPM

## 2018-10-22 DIAGNOSIS — L89.153 SACRAL DECUBITUS ULCER, STAGE III: ICD-10-CM

## 2018-10-22 PROCEDURE — 99212 OFFICE O/P EST SF 10 MIN: CPT

## 2018-10-22 PROCEDURE — 99499 UNLISTED E&M SERVICE: CPT | Mod: ,,, | Performed by: SURGERY

## 2018-10-22 NOTE — PROGRESS NOTES
[facility]  Wound Care  Progress Note    Problem List Items Addressed This Visit     Sacral decubitus ulcer, stage III    Overview     77-year-old female who presented to the wound center on 4/16/18 with a sacral ulcer.  Patient states that she has had this ulcer on and off for 6 months.  Patient struggles with back pain and sleeps sitting up in a bed.  She says she has healed this ulcer before but it has returned.  She had been applying Silvadene and completed a course of oral antibiotics.  She denied significant drainage from the area.  She reports the wound as being painful on admission.  She says she has tried several offloading cushions including a donut and a waffle cushion.  She does use a waffle cushion for sitting.  She has obtained a foam topper for her mattress for pressure relief.  She is sleeping on her left and right sides avoiding her back.  She began utilizing santyl upon admission to wound clinic.   Collagen dressing was begun on 6/4/2018.   Silver alginate was initiated on 08/06/2018.  Patient currently using collagen.  The wound appears resolved.  Patient will be discharged from the Wound Center.               See wound doc progress notes. Documents will be scanned.        Hussein Cordova  [facility]

## 2018-11-05 ENCOUNTER — OFFICE VISIT (OUTPATIENT)
Dept: WOUND CARE | Facility: HOSPITAL | Age: 78
End: 2018-11-05
Attending: SURGERY
Payer: MEDICARE

## 2018-11-05 VITALS — RESPIRATION RATE: 18 BRPM | DIASTOLIC BLOOD PRESSURE: 44 MMHG | SYSTOLIC BLOOD PRESSURE: 113 MMHG | HEART RATE: 61 BPM

## 2018-11-05 DIAGNOSIS — L89.153 SACRAL DECUBITUS ULCER, STAGE III: ICD-10-CM

## 2018-11-05 PROCEDURE — 99499 UNLISTED E&M SERVICE: CPT | Mod: ,,, | Performed by: SURGERY

## 2018-11-05 PROCEDURE — 99212 OFFICE O/P EST SF 10 MIN: CPT

## 2018-11-05 NOTE — PROGRESS NOTES
[facility]  Wound Care  Progress Note    Problem List Items Addressed This Visit     Sacral decubitus ulcer, stage III    Overview     77-year-old female who presented to the wound center on 4/16/18 with a sacral ulcer.  Patient states that she has had this ulcer on and off for 6 months.  Patient struggles with back pain and sleeps sitting up in a bed.  She says she has healed this ulcer before but it has returned.  She had been applying Silvadene and completed a course of oral antibiotics.  She denied significant drainage from the area.  She reports the wound as being painful on admission.  She says she has tried several offloading cushions including a donut and a waffle cushion.  She does use a waffle cushion for sitting.  She has obtained a foam topper for her mattress for pressure relief.  She is sleeping on her left and right sides avoiding her back.  She began utilizing santyl upon admission to wound clinic.   Collagen dressing was begun on 6/4/2018.   Silver alginate was initiated on 08/06/2018.  Patient currently using collagen.  The wound  resolved.  Patient was discharged from the Wound Center several weeks ago.  Patient was seen by home health and there was concern for recurrence of the wound.  Patient presents today for evaluation.  Upon evaluation of the sacrum the wound appears resolved.               See wound doc progress notes. Documents will be scanned.        Hussein Cordova  [facility]

## 2019-08-09 PROBLEM — M81.0 AGE RELATED OSTEOPOROSIS: Status: ACTIVE | Noted: 2019-08-09

## 2019-08-09 PROBLEM — M81.0 OP (OSTEOPOROSIS): Status: ACTIVE | Noted: 2019-08-09

## 2021-01-15 ENCOUNTER — IMMUNIZATION (OUTPATIENT)
Dept: FAMILY MEDICINE | Facility: CLINIC | Age: 81
End: 2021-01-15
Payer: MEDICARE

## 2021-01-15 DIAGNOSIS — Z23 NEED FOR VACCINATION: Primary | ICD-10-CM

## 2021-01-15 PROCEDURE — 91300 COVID-19, MRNA, LNP-S, PF, 30 MCG/0.3 ML DOSE VACCINE: CPT | Mod: PBBFAC | Performed by: INTERNAL MEDICINE

## 2021-02-05 ENCOUNTER — IMMUNIZATION (OUTPATIENT)
Dept: FAMILY MEDICINE | Facility: CLINIC | Age: 81
End: 2021-02-05
Payer: MEDICARE

## 2021-02-05 DIAGNOSIS — Z23 NEED FOR VACCINATION: Primary | ICD-10-CM

## 2021-02-05 PROCEDURE — 91300 COVID-19, MRNA, LNP-S, PF, 30 MCG/0.3 ML DOSE VACCINE: CPT | Mod: PBBFAC

## 2021-02-05 PROCEDURE — 0002A COVID-19, MRNA, LNP-S, PF, 30 MCG/0.3 ML DOSE VACCINE: CPT | Mod: PBBFAC

## 2021-06-18 PROBLEM — S46.009A ROTATOR CUFF INJURY, INITIAL ENCOUNTER: Status: ACTIVE | Noted: 2021-06-18

## 2021-06-18 PROBLEM — M79.603 ARM PAIN: Status: ACTIVE | Noted: 2021-06-18

## 2021-06-18 PROBLEM — S46.912A SHOULDER STRAIN, LEFT, INITIAL ENCOUNTER: Status: ACTIVE | Noted: 2021-06-18

## 2022-10-24 NOTE — ASSESSMENT & PLAN NOTE
There has been little change in the wound since last visit.  We reviewed the importance of offloading once again any particular trying to minimize any times sitting.   Home